# Patient Record
Sex: FEMALE | Race: WHITE | HISPANIC OR LATINO | URBAN - METROPOLITAN AREA
[De-identification: names, ages, dates, MRNs, and addresses within clinical notes are randomized per-mention and may not be internally consistent; named-entity substitution may affect disease eponyms.]

---

## 2020-05-25 ENCOUNTER — EMERGENCY (EMERGENCY)
Facility: HOSPITAL | Age: 30
LOS: 1 days | Discharge: HOME | End: 2020-05-25
Attending: EMERGENCY MEDICINE | Admitting: EMERGENCY MEDICINE
Payer: SELF-PAY

## 2020-05-25 VITALS
OXYGEN SATURATION: 98 % | HEIGHT: 61 IN | DIASTOLIC BLOOD PRESSURE: 84 MMHG | RESPIRATION RATE: 16 BRPM | HEART RATE: 84 BPM | TEMPERATURE: 98 F | WEIGHT: 165.79 LBS | SYSTOLIC BLOOD PRESSURE: 138 MMHG

## 2020-05-25 VITALS
DIASTOLIC BLOOD PRESSURE: 74 MMHG | OXYGEN SATURATION: 99 % | SYSTOLIC BLOOD PRESSURE: 111 MMHG | RESPIRATION RATE: 17 BRPM | HEART RATE: 78 BPM

## 2020-05-25 LAB
ALBUMIN SERPL ELPH-MCNC: 3.6 G/DL — SIGNIFICANT CHANGE UP (ref 3.3–5)
ALP SERPL-CCNC: 99 U/L — SIGNIFICANT CHANGE UP (ref 40–120)
ALT FLD-CCNC: 29 U/L — SIGNIFICANT CHANGE UP (ref 10–45)
ANION GAP SERPL CALC-SCNC: 8 MMOL/L — SIGNIFICANT CHANGE UP (ref 5–17)
APPEARANCE UR: CLEAR — SIGNIFICANT CHANGE UP
AST SERPL-CCNC: 22 U/L — SIGNIFICANT CHANGE UP (ref 10–40)
BILIRUB SERPL-MCNC: 0.1 MG/DL — LOW (ref 0.2–1.2)
BILIRUB UR-MCNC: NEGATIVE — SIGNIFICANT CHANGE UP
BUN SERPL-MCNC: 16 MG/DL — SIGNIFICANT CHANGE UP (ref 7–23)
CALCIUM SERPL-MCNC: 8.6 MG/DL — SIGNIFICANT CHANGE UP (ref 8.4–10.5)
CHLORIDE SERPL-SCNC: 105 MMOL/L — SIGNIFICANT CHANGE UP (ref 96–108)
CO2 SERPL-SCNC: 28 MMOL/L — SIGNIFICANT CHANGE UP (ref 22–31)
COLOR SPEC: YELLOW — SIGNIFICANT CHANGE UP
CREAT SERPL-MCNC: 0.91 MG/DL — SIGNIFICANT CHANGE UP (ref 0.5–1.3)
DIFF PNL FLD: ABNORMAL
GLUCOSE SERPL-MCNC: 111 MG/DL — HIGH (ref 70–99)
GLUCOSE UR QL: NEGATIVE — SIGNIFICANT CHANGE UP
HCT VFR BLD CALC: 38 % — SIGNIFICANT CHANGE UP (ref 34.5–45)
HGB BLD-MCNC: 12.4 G/DL — SIGNIFICANT CHANGE UP (ref 11.5–15.5)
KETONES UR-MCNC: NEGATIVE — SIGNIFICANT CHANGE UP
LEUKOCYTE ESTERASE UR-ACNC: ABNORMAL
MCHC RBC-ENTMCNC: 31.6 PG — SIGNIFICANT CHANGE UP (ref 27–34)
MCHC RBC-ENTMCNC: 32.6 GM/DL — SIGNIFICANT CHANGE UP (ref 32–36)
MCV RBC AUTO: 96.7 FL — SIGNIFICANT CHANGE UP (ref 80–100)
NITRITE UR-MCNC: NEGATIVE — SIGNIFICANT CHANGE UP
NRBC # BLD: 0 /100 WBCS — SIGNIFICANT CHANGE UP (ref 0–0)
PH UR: 6 — SIGNIFICANT CHANGE UP (ref 5–8)
PLATELET # BLD AUTO: 293 K/UL — SIGNIFICANT CHANGE UP (ref 150–400)
POTASSIUM SERPL-MCNC: 4.6 MMOL/L — SIGNIFICANT CHANGE UP (ref 3.5–5.3)
POTASSIUM SERPL-SCNC: 4.6 MMOL/L — SIGNIFICANT CHANGE UP (ref 3.5–5.3)
PROT SERPL-MCNC: 7.6 G/DL — SIGNIFICANT CHANGE UP (ref 6–8.3)
PROT UR-MCNC: NEGATIVE — SIGNIFICANT CHANGE UP
RBC # BLD: 3.93 M/UL — SIGNIFICANT CHANGE UP (ref 3.8–5.2)
RBC # FLD: 13.2 % — SIGNIFICANT CHANGE UP (ref 10.3–14.5)
SODIUM SERPL-SCNC: 141 MMOL/L — SIGNIFICANT CHANGE UP (ref 135–145)
SP GR SPEC: 1.01 — SIGNIFICANT CHANGE UP (ref 1.01–1.02)
TROPONIN I SERPL-MCNC: <.017 NG/ML — LOW (ref 0.02–0.06)
UROBILINOGEN FLD QL: NEGATIVE — SIGNIFICANT CHANGE UP
WBC # BLD: 8.88 K/UL — SIGNIFICANT CHANGE UP (ref 3.8–10.5)
WBC # FLD AUTO: 8.88 K/UL — SIGNIFICANT CHANGE UP (ref 3.8–10.5)

## 2020-05-25 PROCEDURE — 87086 URINE CULTURE/COLONY COUNT: CPT

## 2020-05-25 PROCEDURE — 99284 EMERGENCY DEPT VISIT MOD MDM: CPT | Mod: 25

## 2020-05-25 PROCEDURE — 80053 COMPREHEN METABOLIC PANEL: CPT

## 2020-05-25 PROCEDURE — 99285 EMERGENCY DEPT VISIT HI MDM: CPT

## 2020-05-25 PROCEDURE — 99053 MED SERV 10PM-8AM 24 HR FAC: CPT

## 2020-05-25 PROCEDURE — 93010 ELECTROCARDIOGRAM REPORT: CPT

## 2020-05-25 PROCEDURE — 81025 URINE PREGNANCY TEST: CPT

## 2020-05-25 PROCEDURE — 96361 HYDRATE IV INFUSION ADD-ON: CPT

## 2020-05-25 PROCEDURE — 85027 COMPLETE CBC AUTOMATED: CPT

## 2020-05-25 PROCEDURE — 93005 ELECTROCARDIOGRAM TRACING: CPT

## 2020-05-25 PROCEDURE — 81001 URINALYSIS AUTO W/SCOPE: CPT

## 2020-05-25 PROCEDURE — 96374 THER/PROPH/DIAG INJ IV PUSH: CPT

## 2020-05-25 PROCEDURE — 84484 ASSAY OF TROPONIN QUANT: CPT

## 2020-05-25 RX ORDER — CEFUROXIME AXETIL 250 MG
1 TABLET ORAL
Qty: 14 | Refills: 0
Start: 2020-05-25 | End: 2020-05-31

## 2020-05-25 RX ORDER — CEFUROXIME AXETIL 250 MG
500 TABLET ORAL ONCE
Refills: 0 | Status: COMPLETED | OUTPATIENT
Start: 2020-05-25 | End: 2020-05-25

## 2020-05-25 RX ORDER — IBUPROFEN 200 MG
1 TABLET ORAL
Qty: 30 | Refills: 0
Start: 2020-05-25

## 2020-05-25 RX ORDER — VASOPRESSIN 20 [USP'U]/ML
0.04 INJECTION INTRAVENOUS
Qty: 50 | Refills: 0 | Status: DISCONTINUED | OUTPATIENT
Start: 2020-05-25 | End: 2020-05-25

## 2020-05-25 RX ORDER — SODIUM CHLORIDE 9 MG/ML
1000 INJECTION INTRAMUSCULAR; INTRAVENOUS; SUBCUTANEOUS ONCE
Refills: 0 | Status: COMPLETED | OUTPATIENT
Start: 2020-05-25 | End: 2020-05-25

## 2020-05-25 RX ORDER — ACETAMINOPHEN 500 MG
975 TABLET ORAL ONCE
Refills: 0 | Status: COMPLETED | OUTPATIENT
Start: 2020-05-25 | End: 2020-05-25

## 2020-05-25 RX ORDER — ONDANSETRON 8 MG/1
4 TABLET, FILM COATED ORAL ONCE
Refills: 0 | Status: COMPLETED | OUTPATIENT
Start: 2020-05-25 | End: 2020-05-25

## 2020-05-25 RX ADMIN — SODIUM CHLORIDE 1000 MILLILITER(S): 9 INJECTION INTRAMUSCULAR; INTRAVENOUS; SUBCUTANEOUS at 05:54

## 2020-05-25 RX ADMIN — SODIUM CHLORIDE 1000 MILLILITER(S): 9 INJECTION INTRAMUSCULAR; INTRAVENOUS; SUBCUTANEOUS at 06:45

## 2020-05-25 RX ADMIN — Medication 500 MILLIGRAM(S): at 06:45

## 2020-05-25 RX ADMIN — Medication 975 MILLIGRAM(S): at 05:54

## 2020-05-25 RX ADMIN — ONDANSETRON 4 MILLIGRAM(S): 8 TABLET, FILM COATED ORAL at 05:54

## 2020-05-25 NOTE — ED PROVIDER NOTE - CLINICAL SUMMARY MEDICAL DECISION MAKING FREE TEXT BOX
29yo healthy F with nonspecific HA / dizziness, nonspecific chest pain.  normal exam , vitals and ekg.  very low risk for acs. no signs of stroke/ICH. check labs. tele monitoring for arrhymia in ED. give iv fluids, zofran, tylenol. reassess 29yo healthy F with nonspecific HA / dizziness, nonspecific chest pain.  normal exam , vitals and ekg.  very low risk for acs. no signs of stroke/ICH. check labs. tele monitoring for arrhymia in ED. give iv fluids, zofran, tylenol. reassess    0615: pt feeling better. appears well. NAD. vitals normal, stable.  labs unremarkable except +uti.  ekg normal. started pt on ceftin.

## 2020-05-25 NOTE — ED PROVIDER NOTE - OBJECTIVE STATEMENT
pt c/o headache, diffuse, throbbing/pounding, moderate, since about 2am today.  assoc c dizziness and nausea and feeling faint which started before headache at about 1am.  also c mild chest pressure.  no fever/chills, no cough. no sob. no weakness, numbness, speech or vision changes. no drug use

## 2020-05-25 NOTE — ED PROVIDER NOTE - PATIENT PORTAL LINK FT
You can access the FollowMyHealth Patient Portal offered by [facility] by registering at the following website: [email or web address]. By joining [employer]’s FollowMyHealth portal, you will also be able to view your health information using other applications (apps) compatible with our system.

## 2020-05-25 NOTE — ED ADULT NURSE NOTE - OBJECTIVE STATEMENT
Pt presents to ED w/ c/o headache since 2am, with associated nausea, vomiting, & Chest pressure. pt denies taking medications PTA, denies cough, SOB, fever, chills.

## 2020-05-25 NOTE — ED PROVIDER NOTE - CARE PLAN
Principal Discharge DX:	Urinary tract infection without hematuria, site unspecified  Secondary Diagnosis:	Acute nonintractable headache, unspecified headache type

## 2020-05-25 NOTE — ED PROVIDER NOTE - NSFOLLOWUPINSTRUCTIONS_ED_ALL_ED_FT
Follow Up in 1-3 Days with your own doctor or with  [facility]  Family Medicine  [address] [place] [address]  Phone: [phone]    -take cefuroxime antibiotic  -take ibuprofen as needed for pain    Infección del tracto urinario en mujeres    LO QUE NECESITA SABER:    Jonathan infección en el tracto urinario (ITU) ocurre cuando entran bacterias en springer tracto urinario. La mayoría de las bacterias que entran al tracto urinario salen al orinar. Si la bacteria permanece en el tracto urinario, usted podría contraer jonathan infección. Springer tracto urinario incluye iveth riñones, uréteres, vejiga y uretra. La orina es producida en los riñones y fluye del uréter a la vejiga. La orina sale de la vejiga a través de la uretra. Jonathan infección del tracto urinario es más común in la parte inferior de springer tracto urinario que incluye springer vejiga y uretra. Aparato urinario femenino         INSTRUCCIONES SOBRE EL [facility]PITAL[facility]IA:    Regrese a la denise de emergencias si:    Usted está orinando muy poco o nada en absoluto.      Usted tiene fiebre lupe con temblor y escalofríos.      Usted tiene dolor en el costado o en la espalda que empeora.    Llame a springer médico si:    Tiene fiebre.      Usted no siente mejoría después de 2 días de shree los antibióticos.      Usted está vomitando.      Usted tiene preguntas o inquietudes acerca de springer condición o cuidado.    Medicamentos:    Los antibióticosayudan a combatir jonathan infección bacterial. Si tiene infecciones urinarias con frecuencia (llamadas recurrentes), se le pueden cami antibióticos para que los tome regularmente. Le enseñarán cuándo y cómo usar los antibióticos. El objetivo es prevenir las infecciones urinarias, ismael no causar resistencia a los antibióticos mediante el uso de antibióticos con demasiada frecuencia.      Los medicamentospara disminuir el dolor y el ardor al orinar. También ayudarán a disminuir la sensación de necesitar orinar con frecuencia. Estos medicamentos harán que orine de color anaranjado o tay.      Cedar iveth medicamentos olu se le haya indicado.Consulte con springer médico si usted genny que springer medicamento no le está ayudando o si presenta efectos secundarios. Infórmele si es alérgico a cualquier medicamento. Mantenga jonathan lista actualizada de los medicamentos, las vitaminas y los productos herbales que cierra. Incluya los siguientes datos de los medicamentos: cantidad, frecuencia y motivo de administración. Traiga con usted la lista o los envases de las píldoras a iveth citas de seguimiento. Lleve la lista de los medicamentos con usted en bahman de jonathan emergencia.    Acuda a iveth consultas de control con springer médico según le indicaron.Anote iveth preguntas para que se acuerde de hacerlas elidia iveth visitas.    Evite otra ITU:    Vacíe la vejiga con frecuencia.Orine y vacíe la vejiga tan pronto olu usted sienta la necesidad. No retenga la orina por largos períodos de tiempo.      Límpiese de adelante hacia atrás después de orinar o de tener jonathan evacuación intestinal.[place] ayudará a evitar que los gérmenes entren en el tracto urinario a través de la uretra.      Cedar líquidos olu se le haya indicado.Pregunte cuánto líquido debe shree cada día y cuáles líquidos son los más adecuados para usted. Es probable que usted necesite shree más líquidos de lo habitual para ayudar a deshacerse de la bacteria. No tome alcohol, cafeína ni jugos cítricos. Estos pueden irritar springer vejiga y aumentar iveth síntomas. Springer médico puede recomendarle el jugo de arándano para prevenir jonathan infección urinaria.      Orine después de tener relaciones sexuales.[place] puede ayudar a eliminar las bacterias que pasan elidia el sexo.      No tome duchas vaginales ni use desodorantes femeninos.Estos pueden cambiar el equilibrio químico de la vagina.      Cambie las compresas o los tampones a menudo.[employer] [place] ayudará a evitar que los gérmenes entren en el tracto urinario.      Consulte con springer médico sobre los métodos anticonceptivos.Es posible que tenga que cambiar springer método si está aumentando springer riesgo de infecciones urinarias.      Use ropa interior de algodón y ropa suelta.Los pantalones ajustados y la ropa interior de nylon pueden atrapar humedad y hacer que las bacterias crezcan.      Se puede recomendar el uso de estrógeno vaginal.Genna medicamento ayuda a prevenir las infecciones urinarias en mujeres que lawson pasado por la menopausia o que están en la perimenopausia.      Realice ejercicios para los músculos pélvicos con frecuencia.Los ejercicios para los músculos pélvicos ayudan a empezar y parar de orinar. Los músculos pélvicos ismael ayudan a vaciar la vejiga más fácilmente. Apriete estos músculos firmemente elidia 5 segundos olu si estuviera tratando de retener el flujo de orina. Luego relaje por 5 segundos. Aumente gradualmente a 10 segundos. Megan 3 series de 15 repeticiones al día o olu se le indique.      Dolor de tri ladarius    LO QUE NECESITA SABER:    El dolor de tri ladarius es un dolor o molestia que comienza de repente y empeora rápidamente. Usted puede tener un dolor de tri ladarius sólo cuando siente estrés o come ciertos alimentos. Otro tipo dolor de tri ladarius puede producirse todos los días y a veces varias veces al día.    INSTRUCCIONES SOBRE [facility]ITAL[facility]IA:    Regrese a la denise de emergencias si:    Usted tiene dolor intenso.      Usted tiene entumecimiento en un lado de springer gerardo o cuerpo.      Usted tiene un dolor de tri que ocurre después de un golpe en la tri, jonathan caída u otro trauma.      Tiene dolor de tri, está olvidadizo o confundido o tiene dificultad para hablar.      Tiene dolor de tri, rigidez en el rajiv y fiebre.    Comuníquese con springer médico si:    Usted tiene un dolor de tri ifeoma y está vomitando.      Usted tiene dolor de tri todos los días y no se esperanza aun después de tratarlo.      Iveth jonatan de tri cambian u ocurren nuevos síntomas cuando tiene dolor de tri.      Usted tiene preguntas o inquietudes acerca de springer condición o cuidado.    Medicamentos:Es posible que usted necesite alguno de los siguientes:     Puede administrarsepodrían administrarse. El medicamento que recomienda springer médico dependerá del tipo de dolor de tri que tenga. Usted necesitará shree medicamentos para el dolor de tri según las indicaciones para evitar un problema llamado dolor de tri de rebote. Estos jonatan de tri ocurren con el uso regular de analgésicos para los trastornos de dolor de tri.      Los CALVIN,olu el ibuprofeno, ayudan a disminuir la inflamación, el dolor y la fiebre. Genna medicamento está disponible con o sin jonathan receta médica. Los CALVIN pueden causar sangrado estomacal o problemas renales en ciertas personas. Si usted cierra un medicamento anticoagulante, siempre pregúntele a springer médico si los CALVIN son seguros para usted. Siempre gregory la etiqueta de gnena medicamento y siga las instrucciones.      Acetaminofénalivia el dolor y baja la fiebre. Está disponible sin receta médica. Pregunte la cantidad y la frecuencia con que debe tomarlos. Siga las indicaciones. Gregory las etiquetas de todos los demás medicamentos que esté usando para saber si también contienen acetaminofén, o pregunte a springer médico o farmacéutico. El acetaminofén puede causar daño en el hígado cuando no se cierra de forma correcta. No use más de 3 gramos (3,000 miligramos) en total de acetaminofeno en un día.      Antidepresivosse pueden administrar para algunos tipos de jonatan de tri.      Cedar iveth medicamentos olu se le haya indicado.Consulte con springer médico si usted genny que springer medicamento no le está ayudando o si presenta efectos secundarios. Infórmele si es alérgico a cualquier medicamento. Mantenga jonathan lista actualizada de los medicamentos, las vitaminas y los productos herbales que cierra. Incluya los siguientes datos de los medicamentos: cantidad, frecuencia y motivo de administración. Traiga con usted la lista o los envases de las píldoras a iveth citas de seguimiento. Lleve la lista de los medicamentos con usted en bahman de jonathan emergencia.    El manejo de iveth síntomas:    Aplique hielo o caloren la amanda donde springer hijo siente el dolor de tri. Utilice un paquete (compresa) de hielo o calor. Para un paquete de hielo, también puede colocar hielo molido en jonathan bolsa plástica. Cubra el paquete de hielo o la bolsa con jonathan toalla pequeña antes de aplicarla en la piel. Tanto el hielo olu el calor ayudan a reducir el dolor, y el calor también contribuye a reducir los espasmos musculares. Aplique calor elidia 20 a 30 minutos cada 2 horas. Aplique hielo elidia 15 a 20 minutos cada hora. Aplique calor o hielo elidia el tiempo y la cantidad de días que se le indique. Usted puede alternar el calor y el hielo.      Relaje iveth músculos.Acuéstese en jonathan posición cómoda y cierre iveth ojos. Relaje iveth músculos lentamente. Comience por los dedos de los pies y avance hacia arriba al lesley de springer cuerpo.      Registre en un diario iveth jonatan de tri.Escriba cuándo comienzan y terminan iveth migrañas. Incluya iveth síntomas y qué estaba haciendo cuando comenzó la migraña. Registre lo que comió y lo que tomó las 24 horas previas al comienzo de springer migraña. Describa el dolor y dónde le duele: Lleve un registro de lo que hizo para tratar springer migraña y si obtuvo un resultado satisfactorio.    Cómo prevenir un dolor de tri ladarius:    Evite cualquier cosa que provoque un dolor de tri ladarius.Los ejemplos incluyen la exposición a sustancias químicas, las grandes altitudes o no dormir lo suficiente. Genny jonathan rutina para dormir. Acuéstese y levántese todos los días a la misma hora. No utilice aparatos electrónicos antes de acostarse. Pueden provocarle un dolor de tri o impedirle dormir servando.      No fume.La nicotina y otras sustancias químicas en los cigarrillos y puros pueden desencadenar un dolor de tri ladarius o empeorarlo. Pida información a springer médico si usted actualmente fuma y necesita ayuda para dejar de fumar. Los cigarrillos electrónicos o el tabaco sin humo igualmente contienen nicotina. Consulte con springer médico antes de utilizar estos productos.      Limite el consumo de alcohol según le indicaron.El alcohol puede provocar un dolor de tri ladarius o empeorarlo. Si usted tiene jonatan de tri de racimo, no samina alcohol elidia un episodio. Para otros tipos de jonatan de tri, pregúntele a springer proveedor de atención médica si es seguro para usted beber alcohol. Pregunte cuál es la cantidad adam que puede beber y con qué frecuencia.      Ejercítese según indicaciones.El ejercicio puede reducir la tensión y ayudarlo a aliviar el dolor de tri. Propóngase hacer 30 minutos de actividad física miranda todos los días de la semana. Springer médico puede ayudarle a crear un plan de ejercicios.      Consuma alimentos saludables y variados.Los alimentos saludables incluyen las frutas, verduras, productos lácteos bajos en grasa, ernestina magras, pescado y frijoles cocidos. Springer médico o dietista puede ayudarle a crear planes de comidas si desea evitar los alimentos que provocan jonatan de tri.    Acuda a iveth consultas de control con springer médico según le indicaron.Traiga springer registro de jonatan de tri con usted cuando visite a springer médico. Anote iveth preguntas para que se acuerde de hacerlas elidia iveth visitas.

## 2020-05-26 LAB
CULTURE RESULTS: SIGNIFICANT CHANGE UP
SPECIMEN SOURCE: SIGNIFICANT CHANGE UP

## 2020-05-29 DIAGNOSIS — R51 HEADACHE: ICD-10-CM
